# Patient Record
(demographics unavailable — no encounter records)

---

## 2025-04-23 NOTE — SOCIAL HISTORY
Patient/Caregiver provided printed discharge information.
[TextEntry] : The patient has a boyfriend. She is living with her parents. She has no children. She is a lifetime nonsmoker. She denies any alcohol use or drug use. She works at [employer].
[TextEntry] : The patient has a boyfriend. She is living with her parents. She has no children. She is a lifetime nonsmoker. She denies any alcohol use or drug use. She works at [employer].
[TextEntry] : The patient has a boyfriend. She is living with her parents. She has no children. She is a lifetime nonsmoker. She denies any alcohol use or drug use. She works at [employer].

## 2025-04-23 NOTE — REVIEW OF SYSTEMS
[SOB] : shortness of breath [Chest Discomfort] : chest discomfort [Syncope] : syncope [Dizziness] : dizziness [Fever] : no fever [Headache] : no headache [Weight Gain (___ Lbs)] : no recent weight gain [Chills] : no chills [Feeling Fatigued] : not feeling fatigued [Weight Loss (___ Lbs)] : no recent weight loss [Blurry Vision] : no blurred vision [Seeing Double (Diplopia)] : no diplopia [Eye Pain] : no eye pain [Earache] : no earache [Discharge From Ears] : no discharge from the ears [Hearing Loss] : no hearing loss [Mouth Sores] : no mouth sores [Sore Throat] : no sore throat [Sinus Pressure] : no sinus pressure [Tinnitus] : no tinnitus [Vertigo] : no vertigo [Dyspnea on exertion] : not dyspnea during exertion [Lower Ext Edema] : no extremity edema [Leg Claudication] : no intermittent leg claudication [Palpitations] : no palpitations [Orthopnea] : no orthopnea [PND] : no PND [Cough] : no cough [Wheezing] : no wheezing [Coughing Up Blood] : no hemoptysis [Snoring] : no snoring [Abdominal Pain] : no abdominal pain [Nausea] : no nausea [Vomiting] : no vomiting [Heartburn] : no heartburn [Change in Appetite] : no change in appetite [Change In The Stool] : no change in stool [Dysphagia] : no dysphagia [Diarrhea] : diarrhea [Constipation] : no constipation [Blood in Stool] : no blood in stool [Dysuria] : no dysuria [Pelvic Pain] : no pelvic pain [Abn Vaginal Bleeding] : no unexplained vaginal bleeding [Joint Pain] : no joint pain [Joint Swelling] : no joint swelling [Joint Stiffness] : no joint stiffness [Muscle Cramps] : no muscle cramps [Myalgia] : no myalgia [Rash] : no rash [Itching] : no itching [Change In Color Of Skin] : change in skin color [Skin Lesions] : no skin lesions [Telangiectasias] : no telangiectasias [Tremor] : no tremor was seen [Numbness (Hypoesthesia)] : no numbness [Convulsions] : no convulsions [Tingling (Paresthesia)] : no tingling [Weakness] : no weakness [Limb Weakness (Paresis)] : no limb weakness (Paresis) [Speech Disturbance] : no speech disturbance [Confusion] : no confusion was observed [Memory Lapses Or Loss] : no memory lapses or loss [Depression] : no depression [Anxiety] : no anxiety [Under Stress] : not under stress [Suicidal] : not suicidal [Easy Bleeding] : no tendency for easy bleeding [Swollen Glands] : no swollen glands [Easy Bruising] : no tendency for easy bruising

## 2025-04-23 NOTE — FAMILY HISTORY
[TextEntry] : The patient's mother is 56. She had heart disease requiring open heart surgery and CKD. The patient's father is 54. He is in good health. She has four sisters and two brother. Two of her sisters have thyroid disease.

## 2025-04-23 NOTE — CARDIOLOGY SUMMARY
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

## 2025-04-23 NOTE — CARDIOLOGY SUMMARY
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

## 2025-04-23 NOTE — ASSESSMENT
[FreeTextEntry1] : 1.  Chest pain.  2.  Shortness of breath. 3.  Dizziness, lightheadedness. 4.  Syncope.  5.  Anxiety.

## 2025-04-23 NOTE — CARDIOLOGY SUMMARY
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

## 2025-04-23 NOTE — HISTORY OF PRESENT ILLNESS
[FreeTextEntry1] : Ms. Nikki Lynch is a 20-year-old woman with a history of anxiety, depression, who presents today for cardiovascular consultation. The patient has been having episodes of sharp chest pains. This lasts under a minute and then resolves. She gets a bit short of breath, which lingers for a bit after her pain resolves. She also reports episodes when she becomes very dizzy and lightheaded, and feels as if her blood pressure is dropping. This can occur while walking or when standing for even a few minutes. There is no clear trigger. She reports about 5-6 episodes of syncope in her lifetime, the most recent at the beginning of April, which occurred with a dizzy spell. She states all of her symptoms seems to have started about a year or so ago. Around that time, she had intentionally overdosed with Celexa and Xanax. This was a one time instance, and she has been doing well and has had no repeat attempts. She walks about 2 hours daily. She denies recent palpitations, orthopnea or PND.

## 2025-04-23 NOTE — DISCUSSION/SUMMARY
[___ Month(s)] : in [unfilled] month(s) [EKG obtained to assist in diagnosis and management of assessed problem(s)] : EKG obtained to assist in diagnosis and management of assessed problem(s) [Patient] : the patient [FreeTextEntry2] : Boyfriend [FreeTextEntry1] : The patient's heart rate and blood pressure are well controlled. No medication is currently warranted.   I have asked the patient to undergo an echocardiogram to assess LV size, wall thickness, systolic function, valvular function, and pulmonary artery systolic pressure.   I have asked the patient to undergo a coronary CTA to evaluate for anomalous coronary circulation that can be a cause for ischemic symptoms. At her age I do not expect atherosclerotic disease, but the possibility of a major coronary artery running between the pulmonary artery trunk and aorta happens in about 1 in 750 people, and can be associated with sudden death. She will complete a CMP prior to the study to ensure adequate renal function for contrast dye.  I have asked the patient to complete lab work with a CMP, CBC, TSH and Hemoglobin A1c.  I have asked the patient to undergo 2-week Holter monitoring to evaluate for heart rhythm disturbances. This will be applied today by the MA.  I have asked the patient to complete lab work with a CMP, CBC, TSH, Hemoglobin A1c.   I have asked the patient to follow a low salt, low fat, low cholesterol diet. I have asked the patient not to engage in any new exercises or activities until after the cardiac work up is complete.  I have asked that the patient follow up with me in one months' time, or sooner with any change in symptoms

## 2025-06-13 NOTE — SOCIAL HISTORY
[TextEntry] : The patient has a boyfriend. She is living with her parents. She has no children. She is a lifetime nonsmoker. She denies any alcohol use or drug use. She works at [employer].

## 2025-06-13 NOTE — REVIEW OF SYSTEMS
[Fever] : no fever [Headache] : no headache [Weight Gain (___ Lbs)] : no recent weight gain [Chills] : no chills [Feeling Fatigued] : not feeling fatigued [Weight Loss (___ Lbs)] : no recent weight loss [Blurry Vision] : no blurred vision [Seeing Double (Diplopia)] : no diplopia [Eye Pain] : no eye pain [Earache] : no earache [Discharge From Ears] : no discharge from the ears [Hearing Loss] : no hearing loss [Mouth Sores] : no mouth sores [Sore Throat] : no sore throat [Sinus Pressure] : no sinus pressure [Tinnitus] : no tinnitus [Vertigo] : no vertigo [SOB] : shortness of breath [Dyspnea on exertion] : not dyspnea during exertion [Chest Discomfort] : chest discomfort [Lower Ext Edema] : no extremity edema [Leg Claudication] : no intermittent leg claudication [Palpitations] : no palpitations [Orthopnea] : no orthopnea [PND] : no PND [Syncope] : syncope [Cough] : no cough [Wheezing] : no wheezing [Coughing Up Blood] : no hemoptysis [Snoring] : no snoring [Abdominal Pain] : no abdominal pain [Nausea] : no nausea [Vomiting] : no vomiting [Heartburn] : no heartburn [Change in Appetite] : no change in appetite [Change In The Stool] : no change in stool [Dysphagia] : no dysphagia [Diarrhea] : diarrhea [Constipation] : no constipation [Blood in Stool] : no blood in stool [Dysuria] : no dysuria [Pelvic Pain] : no pelvic pain [Abn Vaginal Bleeding] : no unexplained vaginal bleeding [Joint Pain] : no joint pain [Joint Swelling] : no joint swelling [Joint Stiffness] : no joint stiffness [Muscle Cramps] : no muscle cramps [Myalgia] : no myalgia [Rash] : no rash [Itching] : no itching [Change In Color Of Skin] : change in skin color [Skin Lesions] : no skin lesions [Telangiectasias] : no telangiectasias [Dizziness] : dizziness [Tremor] : no tremor was seen [Numbness (Hypoesthesia)] : no numbness [Convulsions] : no convulsions [Tingling (Paresthesia)] : no tingling [Weakness] : no weakness [Limb Weakness (Paresis)] : no limb weakness (Paresis) [Speech Disturbance] : no speech disturbance [Confusion] : no confusion was observed [Memory Lapses Or Loss] : no memory lapses or loss [Depression] : no depression [Anxiety] : no anxiety [Under Stress] : not under stress [Suicidal] : not suicidal [Easy Bleeding] : no tendency for easy bleeding [Swollen Glands] : no swollen glands [Easy Bruising] : no tendency for easy bruising

## 2025-06-13 NOTE — ASSESSMENT
[FreeTextEntry1] : 1.  Chest pain.  2.  Shortness of breath. 3.  Dizziness, lightheadedness. 4.  Syncope.  5.  Anxiety. 6.  Mild TR.

## 2025-06-13 NOTE — DISCUSSION/SUMMARY
[___ Month(s)] : in [unfilled] month(s) [FreeTextEntry2] : Boyfriend [FreeTextEntry1] : I explained that the mild tricuspid regurgitation is not a life-threatening or critical condition, and that she does not need to worry about damaging her valve. The current recommendation is to monitor this by echocardiogram every 2 years.   I have explained that her testing is low risk testing, so there are no medications or treatments I can offer at this time that would improve survival. However, she is symptomatic, and I can prescribe her something to treat symptoms, should she want to try a medication. At this time, she would prefer to work on managing her symptoms without medication. I have asked her to call my office should her symptoms worsen, or she change her mind so that an appropriate medication can be prescribed.   The patient's heart rate and blood pressure are well controlled. No medication is currently warranted.    I have asked the patient to complete lab work with a CMP, CBC, TSH, and Hemoglobin A1c.   I have asked the patient to follow a low salt, low fat, low cholesterol diet. I have asked the patient to engage in a minimum of 30 minutes of exercise daily.   I have asked that the patient follow up with me in three months' time, or sooner with any change in symptoms.  I, Jessika Grijalva, am scribing for and the presence of Dr. Nilesh Godoman, the following sections HISTORY OF PRESENT ILLNESSS; CARDIOLOGY SUMMARY; ACTIVE PROBLEMS; PAST MEDICAL HISTORY; PAST SURGICAL HISTORY; FAMILY HISTORY; SOCIAL HISTORY; REVIEW OF SYSTEMS; PHYSICAL EXAM; ASSESSMENT; PLAN.

## 2025-06-13 NOTE — HISTORY OF PRESENT ILLNESS
[FreeTextEntry1] : Ms. Nikki Lynch is a 20-year-old woman with a history of anxiety, depression, who presents today for follow up. At my request, the patient completed an echocardiogram on 5/9/2025. This showed normal LV size and wall thickness, and normal systolic function with an EF of 65%. The RV is normal in size with normal systolic function. No AS, no AI, no MS, trace MR, no TS, mild TR, no PS and trace PI was seen. A Holter monitor from 4/18-5/2/2025 showed underlying sinus rhythm with an age-appropriate heart rate variation for age with no significant pauses. One 4-beat -run of SVT was seen. Otherwise, rare PACs and PVCs were seen. The patient is pleased with her testing results. She continues to experience her symptoms, unchanged in severity and frequency from last visit. She has not yet completed her lab work as ordered last visit. She denies recent palpitations, orthopnea or PND.

## 2025-06-13 NOTE — CARDIOLOGY SUMMARY
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